# Patient Record
Sex: FEMALE | Race: ASIAN | Employment: FULL TIME | URBAN - METROPOLITAN AREA
[De-identification: names, ages, dates, MRNs, and addresses within clinical notes are randomized per-mention and may not be internally consistent; named-entity substitution may affect disease eponyms.]

---

## 2017-01-07 RX ORDER — LEVOTHYROXINE SODIUM 112 UG/1
TABLET ORAL
Qty: 90 TABLET | Refills: 0 | Status: SHIPPED | OUTPATIENT
Start: 2017-01-07

## 2017-04-05 RX ORDER — LEVOTHYROXINE SODIUM 112 UG/1
TABLET ORAL
Qty: 90 TABLET | OUTPATIENT
Start: 2017-04-05

## 2017-04-05 NOTE — TELEPHONE ENCOUNTER
Pending Prescriptions Disp Refills    LEVOTHYROXINE SODIUM 112 MCG Oral Tab [Pharmacy Med Name: L-THYROXINE (SYNTHROID) TABS 112MCG] 90 tablet      Sig: TAKE 1 TABLET DAILY       patient no longer under pre scriber care

## 2023-08-18 ENCOUNTER — TELEPHONE (OUTPATIENT)
Dept: FAMILY MEDICINE CLINIC | Facility: CLINIC | Age: 63
End: 2023-08-18

## 2023-09-28 ENCOUNTER — PATIENT OUTREACH (OUTPATIENT)
Dept: CASE MANAGEMENT | Age: 63
End: 2023-09-28

## 2023-09-28 NOTE — PROCEDURES
The office order for PCP removal request is Approved and finalized on September 28, 2023.     Thanks,  [facility] [employer]